# Patient Record
Sex: FEMALE
[De-identification: names, ages, dates, MRNs, and addresses within clinical notes are randomized per-mention and may not be internally consistent; named-entity substitution may affect disease eponyms.]

---

## 2018-10-09 ENCOUNTER — HOSPITAL ENCOUNTER (OUTPATIENT)
Dept: HOSPITAL 80 - FSGY | Age: 83
Discharge: HOME HEALTH | End: 2018-10-09
Attending: ORTHOPAEDIC SURGERY
Payer: COMMERCIAL

## 2018-10-09 VITALS — DIASTOLIC BLOOD PRESSURE: 51 MMHG | SYSTOLIC BLOOD PRESSURE: 108 MMHG

## 2018-10-09 DIAGNOSIS — Z87.891: ICD-10-CM

## 2018-10-09 DIAGNOSIS — Z79.2: ICD-10-CM

## 2018-10-09 DIAGNOSIS — T84.54XA: Primary | ICD-10-CM

## 2018-10-09 DIAGNOSIS — I10: ICD-10-CM

## 2018-10-09 DIAGNOSIS — Z88.0: ICD-10-CM

## 2018-10-09 DIAGNOSIS — Z96.652: ICD-10-CM

## 2018-10-09 LAB — PLATELET # BLD: 214 10^3/UL (ref 150–400)

## 2018-10-09 RX ADMIN — Medication PRN MCG: at 13:17

## 2018-10-09 RX ADMIN — Medication PRN MCG: at 13:36

## 2018-10-09 RX ADMIN — Medication PRN MCG: at 13:08

## 2018-10-09 NOTE — GOP
DATE OF OPERATION:  10/09/2018



SURGEON:  Layla Hilliard MD



ANESTHESIA:  LMA.



PREOPERATIVE DIAGNOSIS:  Left knee wound dehiscence.



POSTOPERATIVE DIAGNOSIS:  Left knee wound dehiscence.



PROCEDURE PERFORMED:  Left knee I and D, irrigation and debridement, with primary closure, complicate
d by the adipose tissue surrounding her knee.



FINDINGS:  





INDICATIONS:  An 87-year-old female who had previously undergone a left total knee arthroplasty.  Had
 been doing well in rehab when she began to have opening of the wound at the inferior portion of her 
knee with an eschar that had formed over the area and continued to drain serous fluid.  Decided to ta
ke her to the operating room and do a formal irrigation and debridement.



DESCRIPTION OF PROCEDURE:  Patient was brought to the operating room, after the left side had been id
entified as the correct side by the patient, nurse and physician.  Once in the operating room, she wa
s placed under general anesthesia using LMA.  Once asleep, a tourniquet was placed around the upper p
ortion of the left thigh, and the left lower extremity was then sterilely prepped and draped in the u
sual fashion using GSI solution.  Once prepped and draped, limb was elevated for 2 minutes in order t
o exsanguinate it.  Tourniquet was inflated to 250 mmHg.  Sharp incision was used around the area of 
the eschar in order to remove the scarred edges of the skin, as well as remove the eschar itself.  Th
e fatty tissue below was also cut sharply until achieving some bleeding tissue.  Blunt dissection was
 carried down deeper, onto the extensor mechanism which was noted to be intact and healing well.  The
re was some serous fluid that had collected between the adipose layer and the extensor mechanism.  A 
scrub brush was used to debride any of the loose tissue that may be on the extensor mechanism or in t
he subcutaneous tissue.  Three liters of antibiotic solution was irrigated through the wound using bu
lb syringe.  Once irrigated fully, the wound was then closed using 0 Vicryl suture for the deep subcu
taneous layers, which was difficult to hold a stitch secondary to the 3 cm of fat that she had in the
 subcutaneous tissue making the closure more complicated, 2-0 Vicryl suture for the subcutaneous laye
rs and then a 2-0 Prolene suture in a vertical mattress type stitch to hold the skin together.  Once 
completed, tourniquet was released at 15 minutes. The wound was then dressed with Xeroform, 4 x 4, wr
apped in Kerlix.  Leg was completely undraped in the operating room, tourniquet removed from the thig
h, and an Ace wrap placed around the knee.  The patient was then woken up, extubated, transferred ont
o a stretcher, and sent to the recovery room in good condition.



TOURNIQUET TIME:  15 minutes.





Job #:  [id number]

## 2018-10-09 NOTE — PDANEPAE
ANE Past Medical History





- Cardiovascular History


Hx Hypertension: Yes


Hx Arrhythmias: No


Hx Chest Pain: No


Hx Coronary Artery / Peripheral Vascular Disease: No


Hx CHF / Valvular Disease: No


Hx Palpitations: No





- Pulmonary History


Hx COPD: No


Hx Asthma/Reactive Airway Disease: No


Hx Recent Upper Respiratory Infection: No


Hx Oxygen in Use at Home: No


Hx Sleep Apnea: No


Sleep Apnea Screening Result - Last Documented: Negative





- Neurologic History


Hx Cerebrovascular Accident: No


Hx Seizures: No


Hx Dementia: No





- Endocrine History


Hx Diabetes: No





- Renal History


Hx Renal Disorders: Yes


Renal History Comment: INTERMITTENT INCONT USES SMALL PAD





- Liver History


Hx Hepatic Disorders: No





- Neurological & Psychiatric Hx


Hx Neurological and Psychiatric Disorders: No





- Cancer History


Hx Cancer: No





- Congenital Disorder History


Hx Congenital Disorders: No





- GI History


Hx Gastrointestinal Disorders: No





- Other Health History


Other Health History: OSTEOARTHRITIS.  DENTAL IMPLANTS.  BRUISES EASILY





- Chronic Pain History


Chronic Pain: No





- Surgical History


Prior Surgeries: left TKA 9/8/18.  JEET CATARACT.  LAMINECTOMY X2.  RT EYE 

MUSCLE.  TONSILLECTOMY





ANE Review of Systems


Review of Systems: 








- Exercise capacity


METS (RN): 4 METS





ANE Patient History





- Allergies


Allergies/Adverse Reactions: 








Penicillins Allergy (Verified 08/14/18 10:49)


 Hives








- Home Medications


Home Medications: 








Fluticasone Nasal [Flonase Nasal [place]] PRN 09/04/18 [Last Taken 06/14/18]


Herbals/Supplements -Info Only  09/04/18 [Last Taken 10/08/18]


Triamterene/Hydrochlorothiazid [Triamterene-Hctz 37.5-25 mg Tb]  09/04/18 [Last 

Taken 10/08/18]


Acetaminophen [Tylenol 325mg (*)]  10/08/18 [Last Taken 10/07/18]


Clindamycin  10/08/18 [Last Taken 10/09/18]


Sennosides/Docusate Sodium [Senokot-S]  10/08/18 [Last Taken 09/14/18]


oxyCODONE IR [Oxycodone Ir (*)] PRN 10/08/18 [Last Taken 10/07/18]








- NPO status


NPO Since - Liquids (Date): 10/09/18


NPO Since - Liquids (Time): 07:30


NPO Since - Solids (Date): 10/08/18


NPO Since - Solids (Time): 06:00





- Smoking Hx


Smoking Status: Former smoker





- Family Anes Hx


Family Hx Anesthesia Complications: none





ANE Labs/Vital Signs





- Labs


Result Diagrams: 


 10/09/18 10:30





 10/09/18 10:30





- Vital Signs


Blood Pressure: 123/74


Heart Rate: 61


Respiratory Rate: 14


O2 Sat (%): 92


Height: 167.64 cm


Weight: 95.254 kg





ANE Physical Exam





- Airway


Neck exam: FROM


Mallampati Score: Class 1


Mouth exam: normal dental/mouth exam





- Pulmonary


Pulmonary: no respiratory distress, no rales or rhonchi, clear to auscultation





- Cardiovascular


Cardiovascular: regular rate and rhythym, no murmur, rub, or gallop





- ASA Status


ASA Status: III





ANE Anesthesia Plan


Anesthesia Plan: GA w LMA

## 2018-10-09 NOTE — POSTOPPROG
Post Op Note


Date of Operation: 10/09/18


Surgeon: Layla Hilliard


Anesthesia: LMA


Pre-op Diagnosis: l knee wound dehiscence


Procedure: l knee I&D


Inf/Abcess present in the surg proc area at time of surgery?: Yes


Depth: Deep Incisional (Fascial)


EBL: 100-500

## 2025-04-15 NOTE — GHP
CHIEF COMPLAINT:  Left knee wound.



HISTORY OF PRESENT ILLNESS:  Ms. Mercado is an 87-year-old female, who recently underwent a left total
 knee replacement.  She has had wound healing problems in the anterior portion of her knee and since 
she is not having skin connecting the area has decided to formally irrigate and debride the area.



ALLERGIES:  Include penicillin.



CURRENT MEDICATIONS:  Include clindamycin.



PRIOR MEDICAL HISTORY:  Includes high blood pressure and arthritis.



PRIOR SURGICAL HISTORY:  Included a total knee replacement as well as back surgery 1949.



SOCIAL HISTORY:  She is a former smoker.  She takes no alcohol.



PHYSICAL EXAMINATION:  EYES:  Pupils equal, round, and reactive to light.  CHEST:  Clear to auscultat
ion __________ rate and rhythm.  ABDOMEN:  Soft and nontender.  EXTREMITIES:  Her left knee reveals t
he epidermal layer not connected with rough eschar noted in the area, with erythema noted.  She is ne
urologically intact distally.



ASSESSMENT AND PLAN:  Patient is status post left knee wound dehiscence.  She is to go to the operati
ng room to undergo a formal I and D.





Job #:  [id number] Recd back from Dr Pabon. Januvia should be dc'd once starting Ozempic.     Also, pt needs to give BS readings since started Ozempic.     Left VM for pt to return call.